# Patient Record
Sex: MALE | URBAN - METROPOLITAN AREA
[De-identification: names, ages, dates, MRNs, and addresses within clinical notes are randomized per-mention and may not be internally consistent; named-entity substitution may affect disease eponyms.]

---

## 2021-07-20 ENCOUNTER — LAB VISIT (OUTPATIENT)
Dept: PRIMARY CARE CLINIC | Facility: OTHER | Age: 31
End: 2021-07-20
Attending: INTERNAL MEDICINE
Payer: OTHER GOVERNMENT

## 2021-07-20 DIAGNOSIS — Z20.822 ENCOUNTER FOR LABORATORY TESTING FOR COVID-19 VIRUS: ICD-10-CM

## 2021-07-20 PROCEDURE — U0003 INFECTIOUS AGENT DETECTION BY NUCLEIC ACID (DNA OR RNA); SEVERE ACUTE RESPIRATORY SYNDROME CORONAVIRUS 2 (SARS-COV-2) (CORONAVIRUS DISEASE [COVID-19]), AMPLIFIED PROBE TECHNIQUE, MAKING USE OF HIGH THROUGHPUT TECHNOLOGIES AS DESCRIBED BY CMS-2020-01-R: HCPCS | Performed by: INTERNAL MEDICINE

## 2021-07-22 LAB
SARS-COV-2 RNA RESP QL NAA+PROBE: NOT DETECTED
SARS-COV-2- CYCLE NUMBER: -1

## 2021-08-17 ENCOUNTER — LAB VISIT (OUTPATIENT)
Dept: PRIMARY CARE CLINIC | Facility: OTHER | Age: 31
End: 2021-08-17
Payer: OTHER GOVERNMENT

## 2021-08-17 DIAGNOSIS — Z20.822 ENCOUNTER FOR LABORATORY TESTING FOR COVID-19 VIRUS: ICD-10-CM

## 2021-08-17 PROCEDURE — U0003 INFECTIOUS AGENT DETECTION BY NUCLEIC ACID (DNA OR RNA); SEVERE ACUTE RESPIRATORY SYNDROME CORONAVIRUS 2 (SARS-COV-2) (CORONAVIRUS DISEASE [COVID-19]), AMPLIFIED PROBE TECHNIQUE, MAKING USE OF HIGH THROUGHPUT TECHNOLOGIES AS DESCRIBED BY CMS-2020-01-R: HCPCS | Performed by: INTERNAL MEDICINE

## 2021-08-18 LAB
SARS-COV-2 RNA RESP QL NAA+PROBE: NOT DETECTED
SARS-COV-2- CYCLE NUMBER: -1

## 2021-11-15 ENCOUNTER — LAB VISIT (OUTPATIENT)
Dept: PRIMARY CARE CLINIC | Facility: OTHER | Age: 31
End: 2021-11-15
Payer: MEDICAID

## 2021-11-15 DIAGNOSIS — Z20.822 ENCOUNTER FOR LABORATORY TESTING FOR COVID-19 VIRUS: ICD-10-CM

## 2021-11-15 PROCEDURE — U0003 INFECTIOUS AGENT DETECTION BY NUCLEIC ACID (DNA OR RNA); SEVERE ACUTE RESPIRATORY SYNDROME CORONAVIRUS 2 (SARS-COV-2) (CORONAVIRUS DISEASE [COVID-19]), AMPLIFIED PROBE TECHNIQUE, MAKING USE OF HIGH THROUGHPUT TECHNOLOGIES AS DESCRIBED BY CMS-2020-01-R: HCPCS

## 2021-11-16 LAB
SARS-COV-2 RNA RESP QL NAA+PROBE: NOT DETECTED
SARS-COV-2- CYCLE NUMBER: NORMAL

## 2022-05-20 ENCOUNTER — HOSPITAL ENCOUNTER (EMERGENCY)
Facility: HOSPITAL | Age: 32
Discharge: HOME | End: 2022-05-20
Attending: EMERGENCY MEDICINE
Payer: MEDICAID

## 2022-05-20 VITALS
TEMPERATURE: 98 F | OXYGEN SATURATION: 98 % | SYSTOLIC BLOOD PRESSURE: 118 MMHG | HEART RATE: 84 BPM | DIASTOLIC BLOOD PRESSURE: 75 MMHG | RESPIRATION RATE: 16 BRPM

## 2022-05-20 DIAGNOSIS — F20.3 UNDIFFERENTIATED SCHIZOPHRENIA: ICD-10-CM

## 2022-05-20 DIAGNOSIS — U07.1 COVID: ICD-10-CM

## 2022-05-20 DIAGNOSIS — F19.920 DRUG INTOXICATION WITHOUT COMPLICATION: Primary | ICD-10-CM

## 2022-05-20 DIAGNOSIS — R46.2 BIZARRE BEHAVIOR: ICD-10-CM

## 2022-05-20 LAB
ALBUMIN SERPL BCP-MCNC: 4.1 G/DL (ref 3.5–5.2)
ALP SERPL-CCNC: 52 U/L (ref 55–135)
ALT SERPL W/O P-5'-P-CCNC: 27 U/L (ref 10–44)
AMPHET+METHAMPHET UR QL: NEGATIVE
ANION GAP SERPL CALC-SCNC: 12 MMOL/L (ref 8–16)
APAP SERPL-MCNC: <3 UG/ML (ref 10–20)
AST SERPL-CCNC: 45 U/L (ref 10–40)
BARBITURATES UR QL SCN>200 NG/ML: NEGATIVE
BASOPHILS # BLD AUTO: 0.03 K/UL (ref 0–0.2)
BASOPHILS NFR BLD: 0.4 % (ref 0–1.9)
BENZODIAZ UR QL SCN>200 NG/ML: NEGATIVE
BILIRUB SERPL-MCNC: 0.5 MG/DL (ref 0.1–1)
BILIRUB UR QL STRIP: NEGATIVE
BUN SERPL-MCNC: 14 MG/DL (ref 6–20)
BZE UR QL SCN: NEGATIVE
CALCIUM SERPL-MCNC: 9.7 MG/DL (ref 8.7–10.5)
CANNABINOIDS UR QL SCN: ABNORMAL
CHLORIDE SERPL-SCNC: 105 MMOL/L (ref 95–110)
CLARITY UR: CLEAR
CO2 SERPL-SCNC: 26 MMOL/L (ref 23–29)
COLOR UR: YELLOW
CREAT SERPL-MCNC: 1.4 MG/DL (ref 0.5–1.4)
CREAT UR-MCNC: 218.7 MG/DL (ref 23–375)
CTP QC/QA: YES
DIFFERENTIAL METHOD: ABNORMAL
EOSINOPHIL # BLD AUTO: 0 K/UL (ref 0–0.5)
EOSINOPHIL NFR BLD: 0.1 % (ref 0–8)
ERYTHROCYTE [DISTWIDTH] IN BLOOD BY AUTOMATED COUNT: 12.7 % (ref 11.5–14.5)
EST. GFR  (AFRICAN AMERICAN): >60 ML/MIN/1.73 M^2
EST. GFR  (NON AFRICAN AMERICAN): >60 ML/MIN/1.73 M^2
ETHANOL SERPL-MCNC: 34 MG/DL
GLUCOSE SERPL-MCNC: 88 MG/DL (ref 70–110)
GLUCOSE UR QL STRIP: NEGATIVE
HCT VFR BLD AUTO: 46.6 % (ref 40–54)
HGB BLD-MCNC: 15.9 G/DL (ref 14–18)
HGB UR QL STRIP: NEGATIVE
IMM GRANULOCYTES # BLD AUTO: 0.02 K/UL (ref 0–0.04)
IMM GRANULOCYTES NFR BLD AUTO: 0.2 % (ref 0–0.5)
KETONES UR QL STRIP: ABNORMAL
LEUKOCYTE ESTERASE UR QL STRIP: NEGATIVE
LYMPHOCYTES # BLD AUTO: 1.2 K/UL (ref 1–4.8)
LYMPHOCYTES NFR BLD: 14.6 % (ref 18–48)
MCH RBC QN AUTO: 32.6 PG (ref 27–31)
MCHC RBC AUTO-ENTMCNC: 34.1 G/DL (ref 32–36)
MCV RBC AUTO: 96 FL (ref 82–98)
METHADONE UR QL SCN>300 NG/ML: NEGATIVE
MONOCYTES # BLD AUTO: 0.5 K/UL (ref 0.3–1)
MONOCYTES NFR BLD: 6 % (ref 4–15)
NEUTROPHILS # BLD AUTO: 6.5 K/UL (ref 1.8–7.7)
NEUTROPHILS NFR BLD: 78.7 % (ref 38–73)
NITRITE UR QL STRIP: NEGATIVE
NRBC BLD-RTO: 0 /100 WBC
OPIATES UR QL SCN: NEGATIVE
PCP UR QL SCN>25 NG/ML: NEGATIVE
PH UR STRIP: 6 [PH] (ref 5–8)
PLATELET # BLD AUTO: 233 K/UL (ref 150–450)
PMV BLD AUTO: 10.3 FL (ref 9.2–12.9)
POTASSIUM SERPL-SCNC: 4.4 MMOL/L (ref 3.5–5.1)
PROT SERPL-MCNC: 7.9 G/DL (ref 6–8.4)
PROT UR QL STRIP: ABNORMAL
RBC # BLD AUTO: 4.88 M/UL (ref 4.6–6.2)
SARS-COV-2 RDRP RESP QL NAA+PROBE: POSITIVE
SODIUM SERPL-SCNC: 143 MMOL/L (ref 136–145)
SP GR UR STRIP: 1.01 (ref 1–1.03)
TOXICOLOGY INFORMATION: ABNORMAL
TSH SERPL DL<=0.005 MIU/L-ACNC: 0.96 UIU/ML (ref 0.4–4)
URN SPEC COLLECT METH UR: ABNORMAL
UROBILINOGEN UR STRIP-ACNC: NEGATIVE EU/DL
VALPROATE SERPL-MCNC: <12.5 UG/ML (ref 50–100)
WBC # BLD AUTO: 8.3 K/UL (ref 3.9–12.7)

## 2022-05-20 PROCEDURE — 80307 DRUG TEST PRSMV CHEM ANLYZR: CPT | Performed by: EMERGENCY MEDICINE

## 2022-05-20 PROCEDURE — 80143 DRUG ASSAY ACETAMINOPHEN: CPT | Performed by: EMERGENCY MEDICINE

## 2022-05-20 PROCEDURE — 96372 THER/PROPH/DIAG INJ SC/IM: CPT | Performed by: EMERGENCY MEDICINE

## 2022-05-20 PROCEDURE — 80053 COMPREHEN METABOLIC PANEL: CPT | Performed by: EMERGENCY MEDICINE

## 2022-05-20 PROCEDURE — 63600175 PHARM REV CODE 636 W HCPCS: Performed by: EMERGENCY MEDICINE

## 2022-05-20 PROCEDURE — 82077 ASSAY SPEC XCP UR&BREATH IA: CPT | Performed by: EMERGENCY MEDICINE

## 2022-05-20 PROCEDURE — 84443 ASSAY THYROID STIM HORMONE: CPT | Performed by: EMERGENCY MEDICINE

## 2022-05-20 PROCEDURE — 80164 ASSAY DIPROPYLACETIC ACD TOT: CPT | Performed by: EMERGENCY MEDICINE

## 2022-05-20 PROCEDURE — 85025 COMPLETE CBC W/AUTO DIFF WBC: CPT | Performed by: EMERGENCY MEDICINE

## 2022-05-20 PROCEDURE — U0002 COVID-19 LAB TEST NON-CDC: HCPCS | Performed by: EMERGENCY MEDICINE

## 2022-05-20 PROCEDURE — 99285 EMERGENCY DEPT VISIT HI MDM: CPT | Mod: 25

## 2022-05-20 PROCEDURE — 81003 URINALYSIS AUTO W/O SCOPE: CPT | Mod: 59 | Performed by: EMERGENCY MEDICINE

## 2022-05-20 RX ORDER — HALOPERIDOL 5 MG/ML
5 INJECTION INTRAMUSCULAR ONCE AS NEEDED
Status: COMPLETED | OUTPATIENT
Start: 2022-05-20 | End: 2022-05-20

## 2022-05-20 RX ORDER — DIPHENHYDRAMINE HYDROCHLORIDE 50 MG/ML
25 INJECTION INTRAMUSCULAR; INTRAVENOUS ONCE AS NEEDED
Status: COMPLETED | OUTPATIENT
Start: 2022-05-20 | End: 2022-05-20

## 2022-05-20 RX ADMIN — DIPHENHYDRAMINE HYDROCHLORIDE 25 MG: 50 INJECTION INTRAMUSCULAR; INTRAVENOUS at 04:05

## 2022-05-20 RX ADMIN — HALOPERIDOL LACTATE 5 MG: 5 INJECTION, SOLUTION INTRAMUSCULAR at 04:05

## 2022-05-20 NOTE — ED NOTES
Patient started standing up and yelling, becoming agitated yelling about food. Upon arrival to patient room to [facility], patient was still yelling, having flight of ideas, not making sense when talking. Food was given to patient and patient was still yelling and agitated. Security and sitter at bedside.

## 2022-05-20 NOTE — ED NOTES
Security at bedside with patient, he is being verbally aggressive towards staff. Pt is unable to be redirected.

## 2022-05-20 NOTE — ED PROVIDER NOTES
SCRIBE #1 NOTE: I, Timoteo Dowell, am scribing for, and in the presence of,  Christina Bolden MD. I have scribed the following portions of the note - Other sections scribed: HPI, ROS, PE.           EM PHYSICIAN NOTE    HPI  This patient presents with a complaint of   Chief Complaint   Patient presents with    Psychiatric Evaluation     Presents to the ED via EMS with c/o bizarre behaviro reported by bystanders. They stated that patient was walking around aimlessly and talking to himself. Reports to smoking marijuana today.        HPI: History is limited due to the acuity of the patient. Nadine Burns is a 31 y.o. male, with a PMHx of Schizophrenia and suicidal ideations, who presents to the ED via EMS and police for psychiatric evaluation after causing a disturbance [facility] [employer] [facility]. Nearby witnesses reported observing bizarre behavior from patient. Patient actively talking to himself. Expresses compliancy with several medications including Wellbutrin, Zoloft, Haldol and Abilify. Admits to smoking marijuana sometime today. No other exacerbating or alleviating factors. No other associated symptoms at this time.       REVIEW of PMH, SOC History and Family History: History of Schizophrenia and suicidal ideations and attempts.  No past medical history on file.  Social history noncontributory  Family history noncontributory  Review of patient's allergies indicates:  No Known Allergies        REVIEW of SYSTEMS  Source: Police Officers and EMS personnel. ROS limited due to acuity of the patient.  The nurse's notes and triage vital signs were reviewed.  GENERAL/CONSTITUTIONAL: SEE HPI  CARDIOVASCULAR: There is no report of chest pain   RESPIRATORY: There is no report of cough or SOB  GASTROINTESTINAL: There is no report of nausea, vomiting, diarrhea  MUSCULOSKELETAL: There is no report of joint or muscle pain. No muscle weakness or tenderness.  SKIN AND BREASTS: There is no report of easy bruising, skin redness,  skin rash.  HEMATOLOGIC/LYMPHATIC: There is no report of anemia, bleeding or clotting defects. There is no report of anticoagulant use.  The remainder of the ROS is negative.        PHYSICAL EXAMINATION    ED Triage Vitals [05/20/22 1527]   Enc Vitals Group      /69      Pulse 94      Resp 20      Temp 98.9 °F (37.2 °C)      Temp src Oral      SpO2 99 %      Weight       Height       Head Circumference       Peak Flow       Pain Score       Pain Loc       Pain Edu?       Excl. in GC?      Vital signs and Pulse Ox reviewed in clinical context. Abnormalities noted: none:  Heart rate, blood pressure, temperature, respiratory rate and pulse ox are all within normal limits for age  Pt's level of consciousness is stuperous, and the patient is in moderate distress. Rambling speech. Labile and disorganized.  Skin: warm, pink and dry.  Capillary refill is less than 2 seconds.  Mucosa:moist  Head and Neck: WNL  Cardiac exam: RRR  Pulmonary exam: unlabored and clear  Abd Exam: soft nontender   Musculoskeletal: no joint tenderness, deformity or swelling. Moving all extremities equally  Neurologic: GCS: GCS 14 for confused speech; 5 over 5 strength, cranial nerves intact, neck supple       Initial Impression:  Intoxication, gravely disabled  Plan:  At this point I am concerned about deterioration of psychiatric illness but due to intoxication I will reassess after patient is clinically sober  Micelle JAIME Bolden MD, 3:48 PM 5/20/2022      Medical decision making:   Nurses notes and Vital Signs reviewed.  Orders Placed This Encounter   Procedures    CBC auto differential    Comprehensive metabolic panel    TSH    Urinalysis, Reflex to Urine Culture Urine, Clean Catch    Drug screen panel, emergency    Ethanol    Acetaminophen level    COVID-19 Rapid Screening    Valproic Acid    Diet Adult Regular (IDDSI Level 7)    Vital signs    Undress patient and allow them to wear facility provided apparel.    POCT COVID-19  Rapid Screening       ED Course as of 05/20/22 2243   Fri May 20, 2022   2056 COVID positive [MH]   2057 Alcohol level elevated at 30 []   2057 CBC normal []   2057 CMP normal []   2057 Drug screen positive for marijuana []   2057 UA reveals a few ketones [MH]   2122 Patient is clinically sober.  He denies SI, HI.  He is not gravely disabled.  His thoughts are linear.  He is apologetic for being disruptive earlier.  He reports he did miss a few doses of his medicines and he be vigilant in taking them and will avoid alcohol and drug use.  We called his family members and willing to bring him home they were concerned about his COVID status but we will give him discharge instructions for self quarantine []      ED Course User Index  [] Christina Bolden MD       Diagnoses that have been ruled out:   None   Diagnoses that are still under consideration:   None   Final diagnoses:   Drug intoxication without complication   Bizarre behavior   Undifferentiated schizophrenia   COVID            Follow-up Information     Follow up With Specialties Details Why Contact [facility]  Schedule an appointment as soon as possible for a visit  [address] [place] [address] [place] [phone], Call to schedule an appointment [address] [address] [phone]          ED Prescriptions     None          This note was created using Dictation Software.  This program may occasionally misinterpret certain words and phrases.      SCRIBE ATTESTATION NOTE:   I attest that I personally performed the services documented by the scribe and acknowledged and confirm the content of the note.   Nurses notes were reviewed.  Christina Bolden        Nurses notes were reviewed.    Scribe Attestation:   Scribe #1: I performed the above scribed service and the documentation accurately describes the services I performed. I attest to the accuracy of the note.          CRITICAL CARE TIME:   These services included the following: chart data review, reviewing nursing notes and researching old charts from internal and external sources, documentation time, consultant collaboration regarding findings and treatment options, medication orders and management, direct patient care, vital sign assessments, physical exam reassessments, and ordering, interpreting and reviewing diagnostic studies/lab tests.    Aggregate critical care time was approximately 15 minutes, which includes only time during which I was engaged in work directly related to the patient's care, as described above, whether at the bedside or elsewhere in the Emergency Department.  It did not include time spent performing other reported procedures or the services of residents, students, nurses or physician assistants.           ED Disposition Condition    Discharge Stable                          Christina Bolden MD  05/20/22 0752

## 2022-05-20 NOTE — ED TRIAGE NOTES
Pt brought in by EMS with c/o bizarre behavior at the local [facility].  Patient was yelling and screaming in the parking lot and talking about magic. No physical complaints.  Pt smells strongly of marijuana.

## 2022-05-20 NOTE — Clinical Note
"Nadine"Sonny Burns was seen and treated in our emergency department on 5/20/2022.     COVID-19 is present in our communities across the state. There is limited testing for COVID at this time, so not all patients can be tested. In this situation, your employee meets the following criteria:    Nadine Burns has met the criteria for COVID-19 testing and has a POSITIVE result. He can return to work once they are asymptomatic for 24 hours without the use of fever reducing medications AND at least five days from the first positive result. A mask is recommended for 5 days post quarantine.     If you have any questions or concerns, or if I can be of further assistance, please do not hesitate to contact me.    Sincerely,             Christina Bolden MD"

## 2022-05-20 NOTE — Clinical Note
"Nadine Tao" Katy was seen and treated in our emergency department on 5/20/2022.  He may return to work on 05/25/2022.       If you have any questions or concerns, please don't hesitate to call.      Christina Bolden MD"

## 2022-05-21 NOTE — ED NOTES
PT re-evaluated by Dr. Bolden and found to have returned to normal mentatio. Provider removed PEC and discharged PT.

## 2022-05-21 NOTE — ED NOTES
Pt sister came to retrieve his personal belongings. Notified visitor that she would not be able to stay in visit due to patient's COVID status. Upon asking the pt if it was okay for the visitor to take his belongings he asked if I could open the door so he could give her something on his person. I asked the patient to clarify what he had on him before I opened the door - given that he is in PEC scrubs and shouldn't have anything in his possession - he stated it was marijuana. I asked the visitor to come with me to the nurses station, where I reported to the charge RN (Susan) that the pt stated he had marijuana on him and was trying to give it to the visitor. Security was notified by charge RN. Visitor took the patient's valuables (#[id number]) and clothing.

## 2022-07-08 ENCOUNTER — HOSPITAL ENCOUNTER (EMERGENCY)
Facility: HOSPITAL | Age: 32
Discharge: HOME | End: 2022-07-08
Attending: EMERGENCY MEDICINE
Payer: MEDICAID

## 2022-07-08 VITALS
WEIGHT: 150 LBS | HEART RATE: 74 BPM | HEIGHT: 68 IN | BODY MASS INDEX: 22.73 KG/M2 | OXYGEN SATURATION: 99 % | TEMPERATURE: 98 F | RESPIRATION RATE: 14 BRPM | DIASTOLIC BLOOD PRESSURE: 78 MMHG | SYSTOLIC BLOOD PRESSURE: 128 MMHG

## 2022-07-08 DIAGNOSIS — T40.601A OPIATE OVERDOSE, ACCIDENTAL OR UNINTENTIONAL, INITIAL ENCOUNTER: ICD-10-CM

## 2022-07-08 DIAGNOSIS — R41.82 ALTERED MENTAL STATUS, UNSPECIFIED ALTERED MENTAL STATUS TYPE: Primary | ICD-10-CM

## 2022-07-08 PROCEDURE — 63600175 PHARM REV CODE 636 W HCPCS: Performed by: EMERGENCY MEDICINE

## 2022-07-08 PROCEDURE — 99284 EMERGENCY DEPT VISIT MOD MDM: CPT | Mod: 25

## 2022-07-08 RX ORDER — BUPROPION HYDROCHLORIDE 150 MG/1
150 TABLET, EXTENDED RELEASE ORAL 2 TIMES DAILY
COMMUNITY
Start: 2022-06-13

## 2022-07-08 RX ORDER — ONDANSETRON 2 MG/ML
8 INJECTION INTRAMUSCULAR; INTRAVENOUS
Status: COMPLETED | OUTPATIENT
Start: 2022-07-08 | End: 2022-07-08

## 2022-07-08 RX ORDER — DIVALPROEX SODIUM 250 MG/1
250 TABLET, FILM COATED, EXTENDED RELEASE ORAL 2 TIMES DAILY
COMMUNITY
Start: 2022-06-13

## 2022-07-08 RX ORDER — SERTRALINE HYDROCHLORIDE 50 MG/1
50 TABLET, FILM COATED ORAL EVERY MORNING
COMMUNITY
Start: 2022-06-13 | End: 2023-12-26 | Stop reason: CLARIF

## 2022-07-08 RX ORDER — ONDANSETRON 4 MG/1
4 TABLET, FILM COATED ORAL EVERY 8 HOURS PRN
Qty: 12 TABLET | Refills: 0 | Status: SHIPPED | OUTPATIENT
Start: 2022-07-08 | End: 2023-12-26 | Stop reason: CLARIF

## 2022-07-08 RX ORDER — NALOXONE HYDROCHLORIDE 4 MG/.1ML
SPRAY NASAL
Qty: 1 EACH | Refills: 11 | Status: SHIPPED | OUTPATIENT
Start: 2022-07-08

## 2022-07-08 RX ADMIN — ONDANSETRON 8 MG: 2 INJECTION INTRAMUSCULAR; INTRAVENOUS at 02:07

## 2022-07-08 NOTE — ED NOTES
Patient up cussing on the phone because he can not get  In touch with anyone to come and pick him up..

## 2022-07-08 NOTE — ED TRIAGE NOTES
"Brother states "the police said they found him in the neighborhood in a grass lot. They said he possibly snorted heroin and he was gone. They had to give him narcan 3 times."  98% on room air. Pt is lethargic, slow to respond. Responds to verbal.   "

## 2022-07-08 NOTE — DISCHARGE INSTRUCTIONS
Please return immediately if you get worse or if new problems develop.  Please follow-up with your primary care doctor this week.  Rest.  Please follow-up with a drug treatment program.  Resources are attached for you.  Narcan as needed for future overdose.  Zofran for nausea

## 2022-07-08 NOTE — ED PROVIDER NOTES
"Encounter Date: 7/8/2022       History     Chief Complaint   Patient presents with    Drug Overdose     Presents to the ED via EMS after an OD. Patient reports snorting heroin and ETOH on board. Upon EMS arrival patient received 4mg IM Narcan with no response, then was bagged with BVM and received 2mg IV Narcan with some response. Patient still lethargic and easily falling asleep in triage.      HPI   This patient arrives by EMS.  There is some report that the patient used heroin this morning and alcohol.  He was treated with 6 mg of Narcan in the field.  He improved.  Even on arrival he was lethargic and falling asleep.  When I questioned the patient about what happened this morning he reports I do not know .  Patient is now awake alert and bright.  He is complaining of some nausea and vomiting.  Patient has used heroin in the past.  Review of patient's allergies indicates:  No Known Allergies  History reviewed. No pertinent past medical history.  History reviewed. No pertinent surgical history.  History reviewed. No pertinent family history.  Social History     Tobacco Use    Smoking status: Current Every Day Smoker     Packs/day: 1.00    Smokeless tobacco: Never Used   Substance Use Topics    Alcohol use: Yes     Comment: "about a gallon" of liquor and beer    Drug use: Yes     Types: Marijuana, Heroin, Codeine     Review of Systems  The patient was questioned specifically with regard to the following.  General: Fever, chills, sweats. Neuro: Headache. Eyes: eye problems. ENT: Ear pain, sore throat. Cardiovascular: Chest pain. Respiratory: Cough, shortness of breath. Gastrointestinal: Abdominal pain, vomiting, diarrhea. Genitourinary: Painful urination.  Musculoskeletal: Arm and leg problems. Skin: Rash.  The review of systems was negative except for the following:  Nausea  Physical Exam     Initial Vitals [07/08/22 1202]   BP Pulse Resp Temp SpO2   122/65 74 18 97.5 °F (36.4 °C) 96 %      MAP       --     "     Physical Exam  The patient was examined specifically for the following:   General:No significant distress, Good color, Warm and dry. Head and neck:Scalp atraumatic, Neck supple. Neurological:Appropriate conversation, Gross motor deficits. Eyes:Conjugate gaze, Clear corneas. ENT: No epistaxis. Cardiac: Regular rate and rhythm, Grossly normal heart tones. Pulmonary: Wheezing, Rales. Gastrointestinal: Abdominal tenderness, Abdominal distention. Musculoskeletal: Extremity deformity, Apparent pain with range of motion of the joints. Skin: Rash.   The findings on examination were normal except for the following:  Patient is awake alert bright and in no apparent distress.  There is no guarding rebound mass or distention.  The lungs are clear the heart tones are normal.  There are no focal neurologic deficits.  There is no clinical evidence of trauma.  ED Course   Procedures  Labs Reviewed - No data to display       Imaging Results    None       Medical decision making:  Given the above this patient presents to the emergency room after was likely narcotic overdose in the field.  He was observed 2 hours and 14 minutes in the emergency room.  He is alert and bright at discharge.  I will treat him for nausea.  I will have him follow up with primary care.  He will be given treatment options for drug rehabilitation.       Medications   ondansetron injection 8 mg (has no administration in time range)                          Clinical Impression:   Final diagnoses:  [R41.82] Altered mental status, unspecified altered mental status type (Primary)  [T40.601A] Opiate overdose, accidental or unintentional, initial encounter          ED Disposition Condition    Discharge Stable        ED Prescriptions     Medication Sig Dispense Start Date End Date Auth. Provider    naloxone (NARCAN) 4 mg/actuation Spry 4mg by nasal route as needed for opioid overdose; may repeat every 2-3 minutes in alternating nostrils until medical help arrives.  Call 911 1 each 7/8/2022  Maurizio Benitez MD    ondansetron (ZOFRAN) 4 MG tablet Take 1 tablet (4 mg total) by mouth every 8 (eight) hours as needed (Nausea and vomiting). 12 tablet 7/8/2022  Maurizio Benitez MD        Follow-up Information     Follow up With Specialties Details Why Contact Info    Kendall Badillo MD Internal Medicine In 3 days  [address] [place] [address] [address]  [address] [place] [address]  [phone]             Maurizio Benitez MD  07/08/22 0570

## 2023-12-26 ENCOUNTER — HOSPITAL ENCOUNTER (OUTPATIENT)
Facility: OTHER | Age: 33
Discharge: HOME | End: 2023-12-26
Attending: INTERNAL MEDICINE | Admitting: HOSPITALIST
Payer: MEDICAID

## 2023-12-26 VITALS
TEMPERATURE: 97 F | HEART RATE: 80 BPM | OXYGEN SATURATION: 99 % | DIASTOLIC BLOOD PRESSURE: 69 MMHG | RESPIRATION RATE: 18 BRPM | SYSTOLIC BLOOD PRESSURE: 118 MMHG | WEIGHT: 144.63 LBS | BODY MASS INDEX: 21.42 KG/M2 | HEIGHT: 69 IN

## 2023-12-26 DIAGNOSIS — D72.829 LEUKOCYTOSIS, UNSPECIFIED TYPE: ICD-10-CM

## 2023-12-26 DIAGNOSIS — R74.01 TRANSAMINITIS: ICD-10-CM

## 2023-12-26 DIAGNOSIS — R00.0 TACHYCARDIA: ICD-10-CM

## 2023-12-26 DIAGNOSIS — R41.82 AMS (ALTERED MENTAL STATUS): ICD-10-CM

## 2023-12-26 DIAGNOSIS — T40.1X1A ACCIDENTAL OVERDOSE OF HEROIN, INITIAL ENCOUNTER: Primary | ICD-10-CM

## 2023-12-26 DIAGNOSIS — I48.91 ATRIAL FIBRILLATION, UNSPECIFIED TYPE: ICD-10-CM

## 2023-12-26 DIAGNOSIS — I48.91 ATRIAL FIBRILLATION WITH RVR: ICD-10-CM

## 2023-12-26 DIAGNOSIS — R10.9 ABDOMINAL PAIN, UNSPECIFIED ABDOMINAL LOCATION: ICD-10-CM

## 2023-12-26 DIAGNOSIS — F19.10 DRUG ABUSE: ICD-10-CM

## 2023-12-26 PROBLEM — F20.0 SCHIZOPHRENIA, PARANOID, CHRONIC: Status: ACTIVE | Noted: 2023-12-26

## 2023-12-26 PROBLEM — R73.9 HYPERGLYCEMIA: Status: ACTIVE | Noted: 2023-12-26

## 2023-12-26 LAB
ALBUMIN SERPL BCP-MCNC: 3.2 G/DL (ref 3.5–5.2)
ALBUMIN SERPL BCP-MCNC: 3.3 G/DL (ref 3.5–5.2)
ALP SERPL-CCNC: 63 U/L (ref 55–135)
ALP SERPL-CCNC: 67 U/L (ref 55–135)
ALT SERPL W/O P-5'-P-CCNC: 549 U/L (ref 10–44)
ALT SERPL W/O P-5'-P-CCNC: 657 U/L (ref 10–44)
AMPHET+METHAMPHET UR QL: NEGATIVE
ANION GAP SERPL CALC-SCNC: 10 MMOL/L (ref 8–16)
ANION GAP SERPL CALC-SCNC: 13 MMOL/L (ref 8–16)
APAP SERPL-MCNC: <3 UG/ML (ref 10–20)
AST SERPL-CCNC: 618 U/L (ref 10–40)
AST SERPL-CCNC: 919 U/L (ref 10–40)
B-OH-BUTYR BLD STRIP-SCNC: 0 MMOL/L (ref 0–0.5)
BACTERIA #/AREA URNS HPF: NORMAL /HPF
BARBITURATES UR QL SCN>200 NG/ML: NEGATIVE
BASOPHILS # BLD AUTO: 0.04 K/UL (ref 0–0.2)
BASOPHILS # BLD AUTO: 0.08 K/UL (ref 0–0.2)
BASOPHILS NFR BLD: 0.3 % (ref 0–1.9)
BASOPHILS NFR BLD: 0.4 % (ref 0–1.9)
BENZODIAZ UR QL SCN>200 NG/ML: NEGATIVE
BILIRUB SERPL-MCNC: 0.3 MG/DL (ref 0.1–1)
BILIRUB SERPL-MCNC: 0.3 MG/DL (ref 0.1–1)
BILIRUB UR QL STRIP: NEGATIVE
BUN SERPL-MCNC: 15 MG/DL (ref 6–20)
BUN SERPL-MCNC: 16 MG/DL (ref 6–20)
BZE UR QL SCN: NEGATIVE
CALCIUM SERPL-MCNC: 8.1 MG/DL (ref 8.7–10.5)
CALCIUM SERPL-MCNC: 8.2 MG/DL (ref 8.7–10.5)
CANNABINOIDS UR QL SCN: ABNORMAL
CHLORIDE SERPL-SCNC: 108 MMOL/L (ref 95–110)
CHLORIDE SERPL-SCNC: 110 MMOL/L (ref 95–110)
CLARITY UR: CLEAR
CO2 SERPL-SCNC: 21 MMOL/L (ref 23–29)
CO2 SERPL-SCNC: 23 MMOL/L (ref 23–29)
COLOR UR: YELLOW
CREAT SERPL-MCNC: 0.9 MG/DL (ref 0.5–1.4)
CREAT SERPL-MCNC: 1.3 MG/DL (ref 0.5–1.4)
CREAT UR-MCNC: 71.7 MG/DL (ref 23–375)
CTP QC/QA: YES
DIFFERENTIAL METHOD: ABNORMAL
DIFFERENTIAL METHOD: ABNORMAL
EOSINOPHIL # BLD AUTO: 0 K/UL (ref 0–0.5)
EOSINOPHIL # BLD AUTO: 0.1 K/UL (ref 0–0.5)
EOSINOPHIL NFR BLD: 0.2 % (ref 0–8)
EOSINOPHIL NFR BLD: 0.4 % (ref 0–8)
ERYTHROCYTE [DISTWIDTH] IN BLOOD BY AUTOMATED COUNT: 13.3 % (ref 11.5–14.5)
ERYTHROCYTE [DISTWIDTH] IN BLOOD BY AUTOMATED COUNT: 13.3 % (ref 11.5–14.5)
EST. GFR  (NO RACE VARIABLE): >60 ML/MIN/1.73 M^2
EST. GFR  (NO RACE VARIABLE): >60 ML/MIN/1.73 M^2
ESTIMATED AVG GLUCOSE: 105 MG/DL (ref 68–131)
GLUCOSE SERPL-MCNC: 250 MG/DL (ref 70–110)
GLUCOSE SERPL-MCNC: 78 MG/DL (ref 70–110)
GLUCOSE UR QL STRIP: ABNORMAL
HAV IGM SERPL QL IA: NORMAL
HBA1C MFR BLD: 5.3 % (ref 4–5.6)
HBV CORE IGM SERPL QL IA: NORMAL
HBV SURFACE AG SERPL QL IA: NORMAL
HCT VFR BLD AUTO: 36.8 % (ref 40–54)
HCT VFR BLD AUTO: 39.7 % (ref 40–54)
HCV AB SERPL QL IA: NEGATIVE
HCV AB SERPL QL IA: NEGATIVE
HGB BLD-MCNC: 12.3 G/DL (ref 14–18)
HGB BLD-MCNC: 13.1 G/DL (ref 14–18)
HGB UR QL STRIP: ABNORMAL
HIV 1+2 AB+HIV1 P24 AG SERPL QL IA: NEGATIVE
HYALINE CASTS #/AREA URNS LPF: 0 /LPF
IMM GRANULOCYTES # BLD AUTO: 0.05 K/UL (ref 0–0.04)
IMM GRANULOCYTES # BLD AUTO: 0.74 K/UL (ref 0–0.04)
IMM GRANULOCYTES NFR BLD AUTO: 0.4 % (ref 0–0.5)
IMM GRANULOCYTES NFR BLD AUTO: 3.4 % (ref 0–0.5)
KETONES UR QL STRIP: NEGATIVE
LACTATE SERPL-SCNC: 1.9 MMOL/L (ref 0.5–2.2)
LEUKOCYTE ESTERASE UR QL STRIP: NEGATIVE
LIPASE SERPL-CCNC: 27 U/L (ref 4–60)
LYMPHOCYTES # BLD AUTO: 1.5 K/UL (ref 1–4.8)
LYMPHOCYTES # BLD AUTO: 2.1 K/UL (ref 1–4.8)
LYMPHOCYTES NFR BLD: 13.1 % (ref 18–48)
LYMPHOCYTES NFR BLD: 9.9 % (ref 18–48)
MAGNESIUM SERPL-MCNC: 1.9 MG/DL (ref 1.6–2.6)
MCH RBC QN AUTO: 31.6 PG (ref 27–31)
MCH RBC QN AUTO: 31.8 PG (ref 27–31)
MCHC RBC AUTO-ENTMCNC: 33 G/DL (ref 32–36)
MCHC RBC AUTO-ENTMCNC: 33.4 G/DL (ref 32–36)
MCV RBC AUTO: 95 FL (ref 82–98)
MCV RBC AUTO: 96 FL (ref 82–98)
METHADONE UR QL SCN>300 NG/ML: NEGATIVE
MICROSCOPIC COMMENT: NORMAL
MONOCYTES # BLD AUTO: 0.6 K/UL (ref 0.3–1)
MONOCYTES # BLD AUTO: 0.7 K/UL (ref 0.3–1)
MONOCYTES NFR BLD: 2.6 % (ref 4–15)
MONOCYTES NFR BLD: 5.9 % (ref 4–15)
NEUTROPHILS # BLD AUTO: 17.9 K/UL (ref 1.8–7.7)
NEUTROPHILS # BLD AUTO: 9.4 K/UL (ref 1.8–7.7)
NEUTROPHILS NFR BLD: 80.1 % (ref 38–73)
NEUTROPHILS NFR BLD: 83.3 % (ref 38–73)
NITRITE UR QL STRIP: NEGATIVE
NRBC BLD-RTO: 0 /100 WBC
NRBC BLD-RTO: 0 /100 WBC
OPIATES UR QL SCN: NEGATIVE
PCP UR QL SCN>25 NG/ML: NEGATIVE
PH UR STRIP: 6 [PH] (ref 5–8)
PHOSPHATE SERPL-MCNC: 3.1 MG/DL (ref 2.7–4.5)
PLATELET # BLD AUTO: 292 K/UL (ref 150–450)
PLATELET # BLD AUTO: 293 K/UL (ref 150–450)
PMV BLD AUTO: 9.5 FL (ref 9.2–12.9)
PMV BLD AUTO: 9.7 FL (ref 9.2–12.9)
POCT GLUCOSE: 278 MG/DL (ref 70–110)
POTASSIUM SERPL-SCNC: 4.1 MMOL/L (ref 3.5–5.1)
POTASSIUM SERPL-SCNC: 4.5 MMOL/L (ref 3.5–5.1)
PROT SERPL-MCNC: 6.3 G/DL (ref 6–8.4)
PROT SERPL-MCNC: 6.8 G/DL (ref 6–8.4)
PROT UR QL STRIP: ABNORMAL
RBC # BLD AUTO: 3.87 M/UL (ref 4.6–6.2)
RBC # BLD AUTO: 4.14 M/UL (ref 4.6–6.2)
RBC #/AREA URNS HPF: 1 /HPF (ref 0–4)
SARS-COV-2 RDRP RESP QL NAA+PROBE: NEGATIVE
SODIUM SERPL-SCNC: 142 MMOL/L (ref 136–145)
SODIUM SERPL-SCNC: 143 MMOL/L (ref 136–145)
SP GR UR STRIP: 1.02 (ref 1–1.03)
SQUAMOUS #/AREA URNS HPF: 1 /HPF
TOXICOLOGY INFORMATION: ABNORMAL
TSH SERPL DL<=0.005 MIU/L-ACNC: 0.86 UIU/ML (ref 0.4–4)
URN SPEC COLLECT METH UR: ABNORMAL
UROBILINOGEN UR STRIP-ACNC: NEGATIVE EU/DL
VALPROATE SERPL-MCNC: <12.5 UG/ML (ref 50–100)
WBC # BLD AUTO: 11.69 K/UL (ref 3.9–12.7)
WBC # BLD AUTO: 21.52 K/UL (ref 3.9–12.7)
WBC #/AREA URNS HPF: 1 /HPF (ref 0–5)
YEAST URNS QL MICRO: NORMAL

## 2023-12-26 PROCEDURE — 80143 DRUG ASSAY ACETAMINOPHEN: CPT | Performed by: INTERNAL MEDICINE

## 2023-12-26 PROCEDURE — 83690 ASSAY OF LIPASE: CPT | Performed by: INTERNAL MEDICINE

## 2023-12-26 PROCEDURE — G0378 HOSPITAL OBSERVATION PER HR: HCPCS

## 2023-12-26 PROCEDURE — 83036 HEMOGLOBIN GLYCOSYLATED A1C: CPT | Performed by: INTERNAL MEDICINE

## 2023-12-26 PROCEDURE — 85025 COMPLETE CBC W/AUTO DIFF WBC: CPT | Performed by: INTERNAL MEDICINE

## 2023-12-26 PROCEDURE — 87389 HIV-1 AG W/HIV-1&-2 AB AG IA: CPT | Performed by: INTERNAL MEDICINE

## 2023-12-26 PROCEDURE — 25000003 PHARM REV CODE 250: Performed by: INTERNAL MEDICINE

## 2023-12-26 PROCEDURE — 84100 ASSAY OF PHOSPHORUS: CPT | Performed by: HOSPITALIST

## 2023-12-26 PROCEDURE — 36415 COLL VENOUS BLD VENIPUNCTURE: CPT | Performed by: INTERNAL MEDICINE

## 2023-12-26 PROCEDURE — 80053 COMPREHEN METABOLIC PANEL: CPT | Mod: 91 | Performed by: HOSPITALIST

## 2023-12-26 PROCEDURE — 84443 ASSAY THYROID STIM HORMONE: CPT | Performed by: INTERNAL MEDICINE

## 2023-12-26 PROCEDURE — 80307 DRUG TEST PRSMV CHEM ANLYZR: CPT | Performed by: INTERNAL MEDICINE

## 2023-12-26 PROCEDURE — 80164 ASSAY DIPROPYLACETIC ACD TOT: CPT | Performed by: INTERNAL MEDICINE

## 2023-12-26 PROCEDURE — 80074 ACUTE HEPATITIS PANEL: CPT | Performed by: INTERNAL MEDICINE

## 2023-12-26 PROCEDURE — 82962 GLUCOSE BLOOD TEST: CPT

## 2023-12-26 PROCEDURE — 25500020 PHARM REV CODE 255: Performed by: INTERNAL MEDICINE

## 2023-12-26 PROCEDURE — 85025 COMPLETE CBC W/AUTO DIFF WBC: CPT | Mod: 91 | Performed by: HOSPITALIST

## 2023-12-26 PROCEDURE — 25000003 PHARM REV CODE 250: Performed by: NURSE PRACTITIONER

## 2023-12-26 PROCEDURE — 83735 ASSAY OF MAGNESIUM: CPT | Performed by: HOSPITALIST

## 2023-12-26 PROCEDURE — 87635 SARS-COV-2 COVID-19 AMP PRB: CPT | Performed by: INTERNAL MEDICINE

## 2023-12-26 PROCEDURE — 99285 EMERGENCY DEPT VISIT HI MDM: CPT | Mod: 25

## 2023-12-26 PROCEDURE — 93005 ELECTROCARDIOGRAM TRACING: CPT

## 2023-12-26 PROCEDURE — 83605 ASSAY OF LACTIC ACID: CPT | Performed by: INTERNAL MEDICINE

## 2023-12-26 PROCEDURE — 80053 COMPREHEN METABOLIC PANEL: CPT | Performed by: INTERNAL MEDICINE

## 2023-12-26 PROCEDURE — 81000 URINALYSIS NONAUTO W/SCOPE: CPT | Mod: 59 | Performed by: INTERNAL MEDICINE

## 2023-12-26 PROCEDURE — 63600175 PHARM REV CODE 636 W HCPCS: Performed by: INTERNAL MEDICINE

## 2023-12-26 PROCEDURE — 96361 HYDRATE IV INFUSION ADD-ON: CPT

## 2023-12-26 PROCEDURE — 96365 THER/PROPH/DIAG IV INF INIT: CPT

## 2023-12-26 PROCEDURE — 87040 BLOOD CULTURE FOR BACTERIA: CPT | Mod: 59 | Performed by: INTERNAL MEDICINE

## 2023-12-26 PROCEDURE — 82010 KETONE BODYS QUAN: CPT | Performed by: INTERNAL MEDICINE

## 2023-12-26 RX ORDER — ACETAMINOPHEN 325 MG/1
650 TABLET ORAL EVERY 4 HOURS PRN
Status: DISCONTINUED | OUTPATIENT
Start: 2023-12-26 | End: 2023-12-26 | Stop reason: HOSPADM

## 2023-12-26 RX ORDER — BUPROPION HYDROCHLORIDE 150 MG/1
150 TABLET ORAL DAILY
Status: DISCONTINUED | OUTPATIENT
Start: 2023-12-26 | End: 2023-12-26 | Stop reason: HOSPADM

## 2023-12-26 RX ORDER — ENOXAPARIN SODIUM 100 MG/ML
40 INJECTION SUBCUTANEOUS EVERY 24 HOURS
Status: DISCONTINUED | OUTPATIENT
Start: 2023-12-26 | End: 2023-12-26 | Stop reason: HOSPADM

## 2023-12-26 RX ORDER — NALOXONE HYDROCHLORIDE 4 MG/.1ML
1 SPRAY NASAL ONCE
Qty: 1 EACH | Refills: 0 | Status: SHIPPED | OUTPATIENT
Start: 2023-12-26 | End: 2023-12-26

## 2023-12-26 RX ORDER — GABAPENTIN 300 MG/1
300 CAPSULE ORAL 3 TIMES DAILY
Status: DISCONTINUED | OUTPATIENT
Start: 2023-12-26 | End: 2023-12-26 | Stop reason: HOSPADM

## 2023-12-26 RX ORDER — SODIUM CHLORIDE 0.9 % (FLUSH) 0.9 %
10 SYRINGE (ML) INJECTION EVERY 8 HOURS PRN
Status: DISCONTINUED | OUTPATIENT
Start: 2023-12-26 | End: 2023-12-26 | Stop reason: HOSPADM

## 2023-12-26 RX ORDER — NALOXONE HCL 0.4 MG/ML
0.02 VIAL (ML) INJECTION
Status: DISCONTINUED | OUTPATIENT
Start: 2023-12-26 | End: 2023-12-26 | Stop reason: HOSPADM

## 2023-12-26 RX ORDER — ONDANSETRON 2 MG/ML
4 INJECTION INTRAMUSCULAR; INTRAVENOUS EVERY 8 HOURS PRN
Status: DISCONTINUED | OUTPATIENT
Start: 2023-12-26 | End: 2023-12-26 | Stop reason: HOSPADM

## 2023-12-26 RX ORDER — DIVALPROEX SODIUM 500 MG/1
500 TABLET, FILM COATED, EXTENDED RELEASE ORAL EVERY 12 HOURS
Status: DISCONTINUED | OUTPATIENT
Start: 2023-12-26 | End: 2023-12-26 | Stop reason: HOSPADM

## 2023-12-26 RX ADMIN — CEFTRIAXONE SODIUM 1 G: 1 INJECTION, POWDER, FOR SOLUTION INTRAMUSCULAR; INTRAVENOUS at 06:12

## 2023-12-26 RX ADMIN — IOHEXOL 75 ML: 350 INJECTION, SOLUTION INTRAVENOUS at 05:12

## 2023-12-26 RX ADMIN — GABAPENTIN 300 MG: 300 CAPSULE ORAL at 10:12

## 2023-12-26 RX ADMIN — SODIUM CHLORIDE 1000 ML: 0.9 INJECTION, SOLUTION INTRAVENOUS at 03:12

## 2023-12-26 RX ADMIN — BUPROPION HYDROCHLORIDE 150 MG: 150 TABLET, FILM COATED, EXTENDED RELEASE ORAL at 10:12

## 2023-12-26 RX ADMIN — SODIUM CHLORIDE 1000 ML: 9 INJECTION, SOLUTION INTRAVENOUS at 02:12

## 2023-12-26 NOTE — HPI
"Mr. Burns is a 33 year old man with a history of drug use, chronic paranoid schizophrenia and bipolar disorder who was brought by EMS after being found down with a positive response to narcan.  Patient has two charts in the system with second MRN [id number].  Notes in the ED are unclear but patient apparently went into rapid atrial fibrillation after arrival here with rhythm terminating spontaneously.  Labs were notable for WBC 21.52K, AST//657, glucose 250.      On my evaluation this morning patient asks why he is here.  When told he was found down on the street he states "That's disrespectful.  I'm gonna hurt someone if I don't find out how I got here."  Patient started pulling on his Christian catheter at which point I terminated the interview.  "

## 2023-12-26 NOTE — ASSESSMENT & PLAN NOTE
Patient presented with atrial fibrillation initially  Rhythm terminated to NSR spontaneously after Christian was placed  Unclear whether he has PAF but does not seem to require intervention currently

## 2023-12-26 NOTE — ASSESSMENT & PLAN NOTE
- Patient reportedly on Depakote, unclear whether this is a contributor to the transaminitis as level is undetectable  - Previous labs in the other chart showed normal liver function  - Will get acute hepatitis panel, acetaminophen level  - Will not be able to order Depakote given the transaminitis - psych consulted for input as his psychosis appears to be uncompensated

## 2023-12-26 NOTE — ASSESSMENT & PLAN NOTE
"- Patient has history of heroin abuse.    - Reportedly found down by EMS and told them he "snorted something."  Opiates negative on tox but had positive response to narcan  - Likely used fentanyl    "

## 2023-12-26 NOTE — SUBJECTIVE & OBJECTIVE
"No past medical history on file.    No past surgical history on file.    Review of patient's allergies indicates:  No Known Allergies    No current facility-administered medications on file prior to encounter.     Current Outpatient Medications on File Prior to Encounter   Medication Sig    buPROPion (WELLBUTRIN SR) 150 MG TBSR 12 hr tablet Take 150 mg by mouth 2 (two) times daily.    divalproex ER (DEPAKOTE ER) 250 MG 24 hr tablet Take 250 mg by mouth 2 (two) times daily.    naloxone (NARCAN) 4 mg/actuation Spry 4mg by nasal route as needed for opioid overdose; may repeat every 2-3 minutes in alternating nostrils until medical help arrives. Call 911    ondansetron (ZOFRAN) 4 MG tablet Take 1 tablet (4 mg total) by mouth every 8 (eight) hours as needed (Nausea and vomiting).    sertraline (ZOLOFT) 50 MG tablet Take 50 mg by mouth every morning.     Family History    None       Tobacco Use    Smoking status: Every Day     Current packs/day: 1.00     Types: Cigarettes    Smokeless tobacco: Never   Substance and Sexual Activity    Alcohol use: Yes     Comment: "about a gallon" of liquor and beer    Drug use: Yes     Types: Marijuana, Heroin, Codeine    Sexual activity: Not on file     Review of Systems   Unable to perform ROS: Psychiatric disorder     Objective:     Vital Signs (Most Recent):  Temp: 96.7 °F (35.9 °C) (12/26/23 0243)  Pulse: 77 (12/26/23 0636)  Resp: 16 (12/26/23 0636)  BP: 101/63 (12/26/23 0632)  SpO2: 99 % (12/26/23 0636) Vital Signs (24h Range):  Temp:  [96.7 °F (35.9 °C)] 96.7 °F (35.9 °C)  Pulse:  [] 77  Resp:  [11-16] 16  SpO2:  [95 %-99 %] 99 %  BP: (101-117)/(63-89) 101/63        There is no height or weight on file to calculate BMI.     Physical Exam   Patient has facial scars and erythema of his left eye.  Vitals on monitor were normal.  Otherwise he was not examined.        Significant Labs: All pertinent labs within the past 24 hours have been reviewed.    Significant Imaging: I have " reviewed all pertinent imaging results/findings within the past 24 hours.

## 2023-12-26 NOTE — ED NOTES
1 pair jeans  1 black shirt  1 pair of brown socks  1 blue belt  2 pair of black socks  1 pair of black shoes  1 camo hat  1 brown beanie  1 bottle of conditioner  1 blue back pack  1 bag of multiple toiletries  1 pair of underwear  1 pair of brown underwear  1 black jacket  1 bottle of gabapentin  1 bottle of robaxin  1 bottle of ibuprofen  1 bottle of bactrim  1 pair brown sun glasses  1 black beanie  1 pair of black headphones  1 red  cord  Various papers and notebooks  1 black  cord  1 mutli colored  cord  1 pair of black slip on shoes  1 white  brick  Condoms    All belongings sent to security

## 2023-12-26 NOTE — ASSESSMENT & PLAN NOTE
Patient has threatening attitude and states he will hurt someone.  Will PEC him and get STAT psych consult

## 2023-12-26 NOTE — ED PROVIDER NOTES
"Encounter date: 2:12 AM 12/26/2023    Source of History   Patient/EMS    Chief Complaint   Pt presents with:   Altered Mental Status (EMS called for altered mental status, given 3 mg narcan by EMS, positive response. Pt drowsy, states he "snorted something". GCS13, )      History Of Present Illness   Nadine Burns is a 33 y.o. male with past medical history schizophrenia, drug use disorder who presents to the ED with EMS for altered mental status, unresponsiveness.  Story obtained by EMS inpatient.  Patient states he was in normal status of health.  He states that he snorted an unknown amount of what he believes was heroin.  He states that he became drowsy and laid down.  His friend next to him noted that he was unresponsive and called EMS.  Story provided by EMS is as follows:  Firefighters arrived on scene 1st and noted that the patient was unresponsive and apneic with pinpoint pupils.  They bagged him and gave him 2 of intranasal Narcan at a proximally 130.  EMS crew arrived around 140 at which time he appeared to be much better but they gave 1 mg of IV Narcan.  They noted that his glucose was elevated to 341 and started a L of fluids.  The patient had no pain.  Patient states that he snorted heroin for recreational purposes.  He denies any thoughts of self-harm.  He states that he feels slightly confused yet denies head trauma or head pain.  He denies history of arrhythmias or diabetes.    This is the extent to the patients complaints today here in the emergency department.  Past History   Review of patient's allergies indicates:  No Known Allergies    No current facility-administered medications on file prior to encounter.     Current Outpatient Medications on File Prior to Encounter   Medication Sig Dispense Refill    buPROPion (WELLBUTRIN SR) 150 MG TBSR 12 hr tablet Take 150 mg by mouth 2 (two) times daily.      divalproex ER (DEPAKOTE ER) 250 MG 24 hr tablet Take 250 mg by mouth 2 (two) times daily.   " "   naloxone (NARCAN) 4 mg/actuation Spry 4mg by nasal route as needed for opioid overdose; may repeat every 2-3 minutes in alternating nostrils until medical help arrives. Call 911 1 each 11    ondansetron (ZOFRAN) 4 MG tablet Take 1 tablet (4 mg total) by mouth every 8 (eight) hours as needed (Nausea and vomiting). 12 tablet 0    sertraline (ZOLOFT) 50 MG tablet Take 50 mg by mouth every morning.         As per HPI and below:  No past medical history on file.  No past surgical history on file.    Social History     Socioeconomic History    Marital status: Single   Tobacco Use    Smoking status: Every Day     Current packs/day: 1.00     Types: Cigarettes    Smokeless tobacco: Never   Substance and Sexual Activity    Alcohol use: Yes     Comment: "about a gallon" of liquor and beer    Drug use: Yes     Types: Marijuana, Heroin, Codeine       No family history on file.    Physical Exam     Vitals:    12/26/23 0243 12/26/23 0430 12/26/23 0632 12/26/23 0636   BP:  110/69 101/63    Pulse:  (!) 121  77   Resp:  11  16   Temp: 96.7 °F (35.9 °C)      TempSrc: Axillary      SpO2:  97%  99%     Physical Exam:   Nursing note and vitals reviewed.  Appearance:  Nontoxic well-appearing male in no acute respiratory distress.  Making purposeful movements.  Speaking in full sentences.  Skin: No obvious rashes seen.  Good turgor.  No abrasions.  No ecchymoses.  Eyes: No conjunctival injection. EOMI, PERRL.  Head: NC/AT  Chest: CTAB. No increased work of breathing, bilateral chest rise.  Cardiovascular: Regular rate and rhythm.  Normal equal bilateral radial pulses.  Abdomen: Soft.  Not distended.  Nontender.  No guarding.  No rebound. No Masses  Musculoskeletal: No obvious deformities.   Neck supple, full range of motion, no obvious deformity.   No tenderness to palpation of cervical through lumbar spine.  No step-offs or deformities. Good range of motion all joints.  Neurologic: Moves all extremities and carries on conversation. CN- " II: PERRL; III/IV/VI: EOMI w/out evidence of nystagmus; V: no deficits appreciated to light touch bilateral face; VII: no facial weakness, no facial asymmetry. Eyebrow raise symmetric. Smile symmetric; IX/X: palate midline, and raises symmetrically; XI: shoulder shrug 5/5 bilaterally; XII: tongue is midline w/out asymmetry. Strength 5/5 to bilateral upper and lower extremities, sensation intact to light touch. Gait normal.  Mental Status:  Alert and oriented x 3 when prompted.       Results and Medications    Procedures    Labs Reviewed   CBC W/ AUTO DIFFERENTIAL - Abnormal; Notable for the following components:       Result Value    WBC 21.52 (*)     RBC 4.14 (*)     Hemoglobin 13.1 (*)     Hematocrit 39.7 (*)     MCH 31.6 (*)     Immature Granulocytes 3.4 (*)     Gran # (ANC) 17.9 (*)     Immature Grans (Abs) 0.74 (*)     Gran % 83.3 (*)     Lymph % 9.9 (*)     Mono % 2.6 (*)     All other components within normal limits   COMPREHENSIVE METABOLIC PANEL - Abnormal; Notable for the following components:    CO2 21 (*)     Glucose 250 (*)     Calcium 8.1 (*)     Albumin 3.2 (*)      (*)      (*)     All other components within normal limits   URINALYSIS - Abnormal; Notable for the following components:    Protein, UA Trace (*)     Glucose, UA 3+ (*)     Occult Blood UA Trace (*)     All other components within normal limits    Narrative:     Specimen Source->Urine   DRUG SCREEN PANEL, URINE EMERGENCY - Abnormal; Notable for the following components:    THC Presumptive Positive (*)     All other components within normal limits    Narrative:     Specimen Source->Urine   VALPROIC ACID - Abnormal; Notable for the following components:    Valproic Acid Level <12.5 (*)     All other components within normal limits   POCT GLUCOSE - Abnormal; Notable for the following components:    POCT Glucose 278 (*)     All other components within normal limits   CULTURE, BLOOD   CULTURE, BLOOD   HIV 1 / 2 ANTIBODY     Narrative:     Release to patient->Immediate   HEPATITIS C ANTIBODY    Narrative:     Release to patient->Immediate   BETA - HYDROXYBUTYRATE, SERUM   TSH   TSH   LACTIC ACID, PLASMA   LIPASE   URINALYSIS MICROSCOPIC    Narrative:     Specimen Source->Urine   ACETAMINOPHEN LEVEL   HEPATITIS PANEL, ACUTE   HEMOGLOBIN A1C   CBC W/ AUTO DIFFERENTIAL   COMPREHENSIVE METABOLIC PANEL   MAGNESIUM   PHOSPHORUS   SARS-COV-2 RDRP GENE       Imaging Results              CT Abdomen Pelvis With IV Contrast NO Oral Contrast (Final result)  Result time 12/26/23 06:22:47      Final result by Ming Templeton MD (12/26/23 06:22:47)                   Impression:      1. Suspect mild periportal edema and question minor pericholecystic fluid, which could reflect sequela of volume resuscitation and/or hepatic dysfunction, in the appropriate clinical context.  2. Additional details, as provided in the body of report.      Electronically signed by: Ming Templeton  Date:    12/26/2023  Time:    06:22               Narrative:    EXAMINATION:  CT ABDOMEN PELVIS WITH IV CONTRAST    CLINICAL HISTORY:  Abdominal abscess/infection suspected;    TECHNIQUE:  Low dose axial images, sagittal and coronal reformations were obtained from the lung bases to the pubic symphysis following the IV administration of 75 mL of Omnipaque 350    COMPARISON:  None.    FINDINGS:  Lower chest: Atelectasis at the lung bases.    Liver: Normal contour.  Mild periportal edema.    Gallbladder and bile ducts: No definite radiodense gallstones.  Question minor pericholecystic fluid.    Pancreas: Unremarkable.    Spleen: Unremarkable.    Adrenals: Unremarkable.    Kidneys: Unremarkable.    Lymph nodes: No abdominal or pelvic lymphadenopathy.    Bowel and mesentery: No definite evidence of bowel obstruction.  Large volume colonic stool.  Appendix not definitely seen, however no definite focal pericecal inflammation is evident.    Abdominal aorta: Unremarkable.    Inferior  vena cava: Unremarkable.    Free fluid or free air: None.    Pelvis: Unremarkable.    Body wall: Unremarkable.    Bones: Unremarkable.                                       X-Ray Chest AP Portable (Final result)  Result time 12/26/23 05:11:04      Final result by Michael John MD (12/26/23 05:11:04)                   Impression:      No convincing radiographic evidence of acute intrathoracic process on this single view.  Additional findings as above.      Electronically signed by: Michael John MD  Date:    12/26/2023  Time:    05:11               Narrative:    EXAMINATION:  XR CHEST AP PORTABLE    CLINICAL HISTORY:  Altered mental status, unspecified    TECHNIQUE:  Single frontal view of the chest was performed.    COMPARISON:  None    FINDINGS:  Cardiac monitoring leads overlie the chest.  Cardiac silhouette appears within normal limits.  Lungs are well expanded without evidence of confluent airspace consolidation.  No significant volume of pleural fluid or pneumothorax identified.  The visualized osseous structures appear intact.  A punctate metallic BB projects over the inferomedial aspect of the left hemithorax, not well evaluated on today's single view.                                          Medications   gabapentin capsule 300 mg (has no administration in time range)   buPROPion TB24 tablet 150 mg (has no administration in time range)   divalproex ER 24 hr tablet 500 mg (has no administration in time range)   sodium chloride 0.9% flush 10 mL (has no administration in time range)   acetaminophen tablet 650 mg (has no administration in time range)   naloxone 0.4 mg/mL injection 0.02 mg (has no administration in time range)   enoxaparin injection 40 mg (has no administration in time range)   ondansetron injection 4 mg (has no administration in time range)   sodium chloride 0.9% bolus 1,000 mL 1,000 mL (0 mLs Intravenous Stopped 12/26/23 0330)   sodium chloride 0.9% bolus 1,000 mL 1,000 mL (0 mLs  Intravenous Stopped 12/26/23 6825)   iohexoL (OMNIPAQUE 350) injection 75 mL (75 mLs Intravenous Given 12/26/23 0511)   cefTRIAXone (ROCEPHIN) 1 g in dextrose 5 % in water (D5W) 100 mL IVPB (MB+) (0 g Intravenous Stopped 12/26/23 0647)       MDM, Impression and Plan   Independently Interpreted Test(s):   EKG:  I independently reviewed and interpreted the EKG and my findings are as follows:   Detailed here or in ED course.   IMAGING:  I have ordered and independently interpreted X-rays and my findings are as follow:  Detailed here or in ED course. CXR shows no pneumothorax, pneumonia or pleural effusions.    Clinical Tests:   Lab Tests: Ordered and Reviewed  Radiological Study: Ordered and Reviewed  Medical Tests: Ordered and Reviewed    Differential diagnosis:  -heroin overdose   -new onset AFib   -electrolyte abnormalities  -DKA   -COVID  -UTI   -aspiration versus aspiration pneumonia    Initial Assessment & ED Management:    Urgent evaluation of 33 y.o. male with 33-year-old man with past medical history as above who presents the ED with chief complaint of altered mental status unresponsiveness in the setting of using heroin.  This ED encounter was complicated as patient made multiple statements and then retracted them.  He was verbally aggressive with myself and staff.  He personally delayed medical care by refusing labs and imaging.  Story obtained by EMS.  On arrival to the ED he is noted to be afebrile, tachycardic with a stable blood pressure in no acute respiratory distress.  He was monitored on end-tidal CO2.  He did note mild abdominal pain.  His glucose was elevated.  Pulses story was unclear basic labs were obtained.  He was noted to have a large leukocytosis.  His hemoglobin was within normal limits.  Due to his leukocytosis, altered mental status and unclear etiology the decision was made to obtain blood cultures, he was given a 30 cc/kg fluid bolus with improvement in his vital signs and ultimately  received Rocephin for antibiotic coverage thought to be due to urinary versus pulmonary sources.  Lipase was within normal limits.  But he noted ongoing abdominal pain with an elevated leukocytosis thus a CT abdomen and pelvis was obtained.  His chest x-ray showed no focal pneumonia.  Presentation not consistent with DKA based off labs.  He is noted to have elevated liver enzymes yet does not have signs pointing towards cholecystitis or ascending biliary infection.  His COVID was noted to be negative.  He would difficulty urinating but was able to void and a postvoid residual was noted to be low.  Due to his new onset AFib which was rate controlled with IV fluids, confusion and leukocytosis I called and discussed the case with Hospital Medicine who was agreeable with admission with pending blood cultures, urinalysis and final read on CT abdomen pelvis.        Medical Decision Making  Amount and/or Complexity of Data Reviewed  Labs: ordered. Decision-making details documented in ED Course.  Radiology: ordered.    Risk  Prescription drug management.               I called and discussed the case with:  Hospital Medicine    Please see ED course for discussion of workup and dispo if not listed above.          Final diagnoses:  [R00.0] Tachycardia  [T40.1X1A] Accidental overdose of heroin, initial encounter (Primary)  [I48.91] Atrial fibrillation, unspecified type  [R41.82] AMS (altered mental status)  [D72.829] Leukocytosis, unspecified type  [R10.9] Abdominal pain, unspecified abdominal location        ED Disposition Condition    Observation Stable               ED Course as of 12/26/23 0805   Tue Dec 26, 2023   0234 EKG shows AFib with RVR PVCs ventricular rate of 124 beats per minute.  Narrow QRS.  No ST segment elevations depressions.  No STEMI. [HM]   0334 Repeat EKG shows atrial fib with RVR and ventricular rate of 108 beats per minute.  No acute signs of ischemia [HM]   0429 WBC(!): 21.52 [HM]   0507 Repeat EKG  shows normal sinus rhythm with ventricular rate of 86 beats per minute.  Narrow QRS.  Nonspecific T-wave abnormalities.  No acute signs of ischemia. []   0608 Postvoid residual 50 cc [HM]      ED Course User Index  [HM] Marily Wick MD Heyward Mack, MD Mack, Heyward B, MD  12/26/23 0826

## 2023-12-26 NOTE — DISCHARGE SUMMARY
"[facility] Medicine  Discharge Summary      Patient Name: Nadine Burns  MRN: [id number]  TERESA: [id number]  Patient Class: OP- Observation  Admission Date: 12/26/2023  Hospital Length of Stay: 0 days  Discharge Date and Time:  12/26/2023 12:57 PM  Attending Physician: tSaci David MD   Discharging Provider: Staci David MD  Primary Care Provider: Angela, Primary Doctor    Primary Care Team: Networked reference to record PCT     HPI:   Mr. Burns is a 33 year old man with a history of drug use, chronic paranoid schizophrenia and bipolar disorder who was brought by EMS after being found down with a positive response to narcan.  Patient has two charts in the system with second MRN [id number].  Notes in the ED are unclear but patient apparently went into rapid atrial fibrillation after arrival here with rhythm terminating spontaneously.  Labs were notable for WBC 21.52K, AST//657, glucose 250.      On my evaluation this morning patient asks why he is here.  When told he was found down on the street he states "That's disrespectful.  I'm gonna hurt someone if I don't find out how I got here."  Patient started pulling on his Christian catheter at which point I terminated the interview.          Hospital Course:   Patient was PEC'd and was evaluated by Psychiatry.  Patient stated he was not interested in harming anyone else and was not suicidal.  PEC was lifted.  Additional labs were done, leukocytosis had resolved without intervention and liver transaminases were improving although still were elevated.  CT of the abdomen had shown some mild periportal edema consistent with hepatic dysfunction.  Patient's Tylenol level was <3 and acute hepatitis panel was negative.  His Depakote level was undetectable, and he would not tell the psychiatrist what medications he was taking.  We discussed possible causes of his liver issue and he acknowledged that it was probably due to alcohol abuse.  He was " not interested in staying in the hospital for further workup and was angry that we were unable to locate the ambulance that had brought him in as his phone was missing and he thought it might be on the ambulance.  Patient was referred to hepatology clinic on discharge for further evaluation for potential liver disease.     Goals of Care Treatment Preferences:  Code Status: Full Code      Consults:   Consults (From admission, onward)          Status Ordering Provider     Inpatient consult to Telemedicine - Psych  Once        Provider:  (Not yet assigned)    Completed PARMINDER ESTEBAN              Final Active Diagnoses:    Diagnosis Date Noted POA    PRINCIPAL PROBLEM:  Drug abuse [F19.10] 12/26/2023 Yes    Schizophrenia, paranoid, chronic [F20.0] 12/26/2023 Yes    Atrial fibrillation with RVR [I48.91] 12/26/2023 Yes    Transaminitis [R74.01] 12/26/2023 Yes    Hyperglycemia [R73.9] 12/26/2023 Yes      Problems Resolved During this Admission:       Discharged Condition: stable    Disposition: Home or Self Care    Follow Up:    Patient Instructions:      Diet Adult Regular     Activity as tolerated         Medications:  Reconciled Home Medications:      Medication List        CHANGE how you take these medications      * naloxone 4 mg/actuation Spry  Commonly known as: NARCAN  4mg by nasal route as needed for opioid overdose; may repeat every 2-3 minutes in alternating nostrils until medical help arrives. Call 911  What changed: Another medication with the same name was added. Make sure you understand how and when to take each.     * naloxone 4 mg/actuation Spry  Commonly known as: NARCAN  1 spray (4 mg total) by Nasal route once. for 1 dose  What changed: You were already taking a medication with the same name, and this prescription was added. Make sure you understand how and when to take each.           * This list has 2 medication(s) that are the same as other medications prescribed for you. Read the directions  carefully, and ask your doctor or other care provider to review them with you.                CONTINUE taking these medications      buPROPion 150 MG TBSR 12 hr tablet  Commonly known as: WELLBUTRIN SR  Take 150 mg by mouth 2 (two) times daily.     divalproex  MG 24 hr tablet  Commonly known as: DEPAKOTE ER  Take 250 mg by mouth 2 (two) times daily.                Time spent on the discharge of patient: >30 minutes         Staci Simons MD  Department of Hospital Medicine  [facility] - Emergency Dept
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
## 2023-12-26 NOTE — Clinical Note
Diagnosis: AMS (altered mental status) [[id number]]   Future Attending Provider: PARMINDER ESTEBAN [[id number]]   Admitting Provider:: PARMINDER ESTEBAN [[id number]]
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
## 2023-12-26 NOTE — HOSPITAL COURSE
Patient was PEC'd and was evaluated by Psychiatry.  Patient stated he was not interested in harming anyone else and was not suicidal.  PEC was lifted.  Additional labs were done, leukocytosis had resolved without intervention and liver transaminases were improving although still were elevated.  CT of the abdomen had shown some mild periportal edema consistent with hepatic dysfunction.  Patient's Tylenol level was <3 and acute hepatitis panel was negative.  His Depakote level was undetectable, and he would not tell the psychiatrist what medications he was taking.  We discussed possible causes of his liver issue and he acknowledged that it was probably due to alcohol abuse.  He was not interested in staying in the hospital for further workup and was angry that we were unable to locate the ambulance that had brought him in as his phone was missing and he thought it might be on the ambulance.  Patient was referred to hepatology clinic on discharge for further evaluation for potential liver disease.
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
## 2023-12-27 ENCOUNTER — TELEPHONE (OUTPATIENT)
Dept: ORTHOPEDICS | Facility: CLINIC | Age: 33
End: 2023-12-27
Payer: MEDICAID

## 2023-12-28 ENCOUNTER — TELEPHONE (OUTPATIENT)
Dept: ORTHOPEDICS | Facility: CLINIC | Age: 33
End: 2023-12-28
Payer: MEDICAID

## 2023-12-31 LAB
BACTERIA BLD CULT: NORMAL
BACTERIA BLD CULT: NORMAL